# Patient Record
Sex: FEMALE | Race: WHITE | NOT HISPANIC OR LATINO | Employment: PART TIME | URBAN - NONMETROPOLITAN AREA
[De-identification: names, ages, dates, MRNs, and addresses within clinical notes are randomized per-mention and may not be internally consistent; named-entity substitution may affect disease eponyms.]

---

## 2023-12-14 ENCOUNTER — TELEPHONE (OUTPATIENT)
Dept: CARDIOLOGY | Facility: CLINIC | Age: 68
End: 2023-12-14
Payer: MEDICARE

## 2023-12-19 ENCOUNTER — OFFICE VISIT (OUTPATIENT)
Dept: CARDIOLOGY | Facility: CLINIC | Age: 68
End: 2023-12-19
Payer: MEDICARE

## 2023-12-19 VITALS
WEIGHT: 157.2 LBS | BODY MASS INDEX: 25.26 KG/M2 | DIASTOLIC BLOOD PRESSURE: 70 MMHG | HEART RATE: 70 BPM | SYSTOLIC BLOOD PRESSURE: 110 MMHG | HEIGHT: 66 IN

## 2023-12-19 DIAGNOSIS — I34.1 MVP (MITRAL VALVE PROLAPSE): ICD-10-CM

## 2023-12-19 DIAGNOSIS — R00.2 PALPITATIONS: Primary | ICD-10-CM

## 2023-12-19 DIAGNOSIS — R07.89 CHEST TIGHTNESS: ICD-10-CM

## 2023-12-19 DIAGNOSIS — E78.00 HYPERCHOLESTEROLEMIA: ICD-10-CM

## 2023-12-19 DIAGNOSIS — R06.02 SHORTNESS OF BREATH: ICD-10-CM

## 2023-12-19 DIAGNOSIS — R53.82 CHRONIC FATIGUE: ICD-10-CM

## 2023-12-19 PROCEDURE — 1159F MED LIST DOCD IN RCRD: CPT | Performed by: INTERNAL MEDICINE

## 2023-12-19 PROCEDURE — 1160F RVW MEDS BY RX/DR IN RCRD: CPT | Performed by: INTERNAL MEDICINE

## 2023-12-19 PROCEDURE — 93000 ELECTROCARDIOGRAM COMPLETE: CPT | Performed by: INTERNAL MEDICINE

## 2023-12-19 PROCEDURE — 99204 OFFICE O/P NEW MOD 45 MIN: CPT | Performed by: INTERNAL MEDICINE

## 2023-12-19 RX ORDER — DIPHENOXYLATE HYDROCHLORIDE AND ATROPINE SULFATE 2.5; .025 MG/1; MG/1
1 TABLET ORAL WEEKLY
COMMUNITY

## 2023-12-19 RX ORDER — ASPIRIN 81 MG/1
81 TABLET ORAL WEEKLY
COMMUNITY

## 2023-12-20 ENCOUNTER — PATIENT ROUNDING (BHMG ONLY) (OUTPATIENT)
Dept: CARDIOLOGY | Facility: CLINIC | Age: 68
End: 2023-12-20
Payer: MEDICARE

## 2024-01-03 ENCOUNTER — HOSPITAL ENCOUNTER (OUTPATIENT)
Dept: CARDIOLOGY | Facility: HOSPITAL | Age: 69
Discharge: HOME | End: 2024-01-03
Payer: MEDICARE

## 2024-01-03 VITALS — HEIGHT: 66 IN | BODY MASS INDEX: 25.26 KG/M2 | WEIGHT: 157.19 LBS

## 2024-01-03 DIAGNOSIS — R07.89 CHEST TIGHTNESS: ICD-10-CM

## 2024-01-03 DIAGNOSIS — R06.02 SHORTNESS OF BREATH: ICD-10-CM

## 2024-01-03 LAB
AORTIC DIMENSIONLESS INDEX: 0.82 (DI)
BH CV ECHO MEAS - ACS: 1.87 CM
BH CV ECHO MEAS - AO MAX PG: 4.2 MMHG
BH CV ECHO MEAS - AO MEAN PG: 2.5 MMHG
BH CV ECHO MEAS - AO ROOT DIAM: 3 CM
BH CV ECHO MEAS - AO V2 MAX: 102.4 CM/SEC
BH CV ECHO MEAS - AO V2 VTI: 24.9 CM
BH CV ECHO MEAS - EDV(CUBED): 72.3 ML
BH CV ECHO MEAS - EF_3D-VOL: 61 %
BH CV ECHO MEAS - ESV(CUBED): 24.3 ML
BH CV ECHO MEAS - FS: 30.4 %
BH CV ECHO MEAS - IVS/LVPW: 0.99 CM
BH CV ECHO MEAS - IVSD: 0.96 CM
BH CV ECHO MEAS - LA DIMENSION: 2.9 CM
BH CV ECHO MEAS - LAT PEAK E' VEL: 6 CM/SEC
BH CV ECHO MEAS - LV MASS(C)D: 128.5 GRAMS
BH CV ECHO MEAS - LV MAX PG: 3.1 MMHG
BH CV ECHO MEAS - LV MEAN PG: 1.39 MMHG
BH CV ECHO MEAS - LV V1 MAX: 88.4 CM/SEC
BH CV ECHO MEAS - LV V1 VTI: 20.4 CM
BH CV ECHO MEAS - LVIDD: 4.2 CM
BH CV ECHO MEAS - LVIDS: 2.9 CM
BH CV ECHO MEAS - LVPWD: 0.97 CM
BH CV ECHO MEAS - MED PEAK E' VEL: 7.3 CM/SEC
BH CV ECHO MEAS - MV A MAX VEL: 63.1 CM/SEC
BH CV ECHO MEAS - MV DEC SLOPE: 462.2 CM/SEC2
BH CV ECHO MEAS - MV DEC TIME: 0.26 SEC
BH CV ECHO MEAS - MV E MAX VEL: 88.4 CM/SEC
BH CV ECHO MEAS - MV E/A: 1.4
BH CV ECHO MEAS - MV MAX PG: 4.8 MMHG
BH CV ECHO MEAS - MV MEAN PG: 1.77 MMHG
BH CV ECHO MEAS - MV P1/2T: 72.2 MSEC
BH CV ECHO MEAS - MV V2 VTI: 38.3 CM
BH CV ECHO MEAS - MVA(P1/2T): 3 CM2
BH CV ECHO MEAS - PA V2 MAX: 73.4 CM/SEC
BH CV ECHO MEAS - RAP SYSTOLE: 8 MMHG
BH CV ECHO MEAS - RV MAX PG: 1.06 MMHG
BH CV ECHO MEAS - RV V1 MAX: 51.5 CM/SEC
BH CV ECHO MEAS - RV V1 VTI: 11.9 CM
BH CV ECHO MEAS - RVDD: 3 CM
BH CV ECHO MEAS - RVSP: 30.4 MMHG
BH CV ECHO MEAS - TAPSE (>1.6): 2.7 CM
BH CV ECHO MEAS - TR MAX PG: 22.4 MMHG
BH CV ECHO MEAS - TR MAX VEL: 236.7 CM/SEC
BH CV ECHO MEASUREMENTS AVERAGE E/E' RATIO: 13.29
BH CV REST NUCLEAR ISOTOPE DOSE: 10 MCI
BH CV STRESS NUCLEAR ISOTOPE DOSE: 30 MCI
BH CV STRESS RECOVERY BP: NORMAL MMHG
BH CV STRESS RECOVERY HR: 67 BPM
BH CV XLRA - TDI S': 10.9 CM/SEC
LV EF NUC BP: 67 %
MAXIMAL PREDICTED HEART RATE: 153 BPM
PERCENT MAX PREDICTED HR: 92.16 %
SINUS: 2.9 CM
STRESS BASELINE BP: NORMAL MMHG
STRESS BASELINE HR: 62 BPM
STRESS PERCENT HR: 108 %
STRESS POST ESTIMATED WORKLOAD: 10.1 METS
STRESS POST EXERCISE DUR MIN: 7 MIN
STRESS POST EXERCISE DUR SEC: 5 SEC
STRESS POST PEAK BP: NORMAL MMHG
STRESS POST PEAK HR: 141 BPM
STRESS TARGET HR: 130 BPM

## 2024-01-03 PROCEDURE — 93017 CV STRESS TEST TRACING ONLY: CPT

## 2024-01-03 PROCEDURE — A9500 TC99M SESTAMIBI: HCPCS | Performed by: INTERNAL MEDICINE

## 2024-01-03 PROCEDURE — 0 TECHNETIUM SESTAMIBI: Performed by: INTERNAL MEDICINE

## 2024-01-03 PROCEDURE — 93306 TTE W/DOPPLER COMPLETE: CPT

## 2024-01-03 PROCEDURE — 78452 HT MUSCLE IMAGE SPECT MULT: CPT

## 2024-01-03 RX ADMIN — TECHNETIUM TC 99M SESTAMIBI 1 DOSE: 1 INJECTION INTRAVENOUS at 09:50

## 2024-01-03 RX ADMIN — TECHNETIUM TC 99M SESTAMIBI 1 DOSE: 1 INJECTION INTRAVENOUS at 08:32

## 2024-01-05 ENCOUNTER — TELEPHONE (OUTPATIENT)
Dept: CARDIOLOGY | Facility: CLINIC | Age: 69
End: 2024-01-05
Payer: MEDICARE

## 2024-01-05 RX ORDER — METOPROLOL SUCCINATE 25 MG/1
25 TABLET, EXTENDED RELEASE ORAL DAILY
Qty: 90 TABLET | Refills: 3 | Status: SHIPPED | OUTPATIENT
Start: 2024-01-05

## 2024-01-09 RX ORDER — METOPROLOL SUCCINATE 25 MG/1
25 TABLET, EXTENDED RELEASE ORAL DAILY
Qty: 90 TABLET | Refills: 3 | Status: SHIPPED | OUTPATIENT
Start: 2024-01-09

## 2024-06-25 ENCOUNTER — OFFICE VISIT (OUTPATIENT)
Dept: CARDIOLOGY | Facility: CLINIC | Age: 69
End: 2024-06-25
Payer: MEDICARE

## 2024-06-25 VITALS
WEIGHT: 153.8 LBS | DIASTOLIC BLOOD PRESSURE: 64 MMHG | HEART RATE: 68 BPM | SYSTOLIC BLOOD PRESSURE: 108 MMHG | BODY MASS INDEX: 24.72 KG/M2 | HEIGHT: 66 IN

## 2024-06-25 DIAGNOSIS — R00.2 PALPITATIONS: Primary | ICD-10-CM

## 2024-06-25 DIAGNOSIS — R53.82 CHRONIC FATIGUE: ICD-10-CM

## 2024-06-25 DIAGNOSIS — I47.10 PSVT (PAROXYSMAL SUPRAVENTRICULAR TACHYCARDIA): ICD-10-CM

## 2024-06-25 DIAGNOSIS — I34.0 MITRAL VALVE INSUFFICIENCY, UNSPECIFIED ETIOLOGY: ICD-10-CM

## 2024-06-25 DIAGNOSIS — E78.00 HYPERCHOLESTEROLEMIA: ICD-10-CM

## 2024-06-25 PROCEDURE — 93000 ELECTROCARDIOGRAM COMPLETE: CPT | Performed by: INTERNAL MEDICINE

## 2024-06-25 PROCEDURE — 1160F RVW MEDS BY RX/DR IN RCRD: CPT | Performed by: INTERNAL MEDICINE

## 2024-06-25 PROCEDURE — 99214 OFFICE O/P EST MOD 30 MIN: CPT | Performed by: INTERNAL MEDICINE

## 2024-06-25 PROCEDURE — 1159F MED LIST DOCD IN RCRD: CPT | Performed by: INTERNAL MEDICINE

## 2025-06-23 ENCOUNTER — TELEPHONE (OUTPATIENT)
Dept: CARDIOLOGY | Facility: CLINIC | Age: 70
End: 2025-06-23

## 2025-06-23 NOTE — TELEPHONE ENCOUNTER
"Caller: Bigg Devine    Relationship to patient: Self    Best call back number: [phone]     Chief complaint: HAS WORK, PER APPT NOTES \"1 YEAR F/U WITH DR KUNZ\" PT IS OKAY TO SEE ANYONE FOR SOONER APPT BUT DID NOT WANT TO OVERSTEP OFFICE WHEN SCHD     Type of visit: F/U     Requested date: TUESDAY      If rescheduling, when is the original appointment: 6/25/25        "

## 2025-06-24 ENCOUNTER — OFFICE VISIT (OUTPATIENT)
Dept: CARDIOLOGY | Facility: CLINIC | Age: 70
End: 2025-06-24
Payer: MEDICARE

## 2025-06-24 VITALS
BODY MASS INDEX: 22.28 KG/M2 | SYSTOLIC BLOOD PRESSURE: 110 MMHG | DIASTOLIC BLOOD PRESSURE: 60 MMHG | HEIGHT: 66 IN | HEART RATE: 76 BPM | WEIGHT: 138.6 LBS

## 2025-06-24 DIAGNOSIS — I34.0 MITRAL VALVE INSUFFICIENCY, UNSPECIFIED ETIOLOGY: ICD-10-CM

## 2025-06-24 DIAGNOSIS — R00.2 PALPITATIONS: Primary | ICD-10-CM

## 2025-06-24 DIAGNOSIS — E78.00 HYPERCHOLESTEROLEMIA: ICD-10-CM

## 2025-06-24 DIAGNOSIS — I47.10 PSVT (PAROXYSMAL SUPRAVENTRICULAR TACHYCARDIA): ICD-10-CM

## 2025-06-24 PROCEDURE — 99213 OFFICE O/P EST LOW 20 MIN: CPT | Performed by: INTERNAL MEDICINE

## 2025-06-24 PROCEDURE — 1159F MED LIST DOCD IN RCRD: CPT | Performed by: INTERNAL MEDICINE

## 2025-06-24 PROCEDURE — 1160F RVW MEDS BY RX/DR IN RCRD: CPT | Performed by: INTERNAL MEDICINE

## 2025-06-24 NOTE — PROGRESS NOTES
Chief Complaint   Patient presents with   • Follow-up     Cardiac management . Patient is here today for yearly follow up   • LABS     Had labs from Dr Maria . May 7,2025   CHO- 212  TRI- 115  HDL- 84  LDL-  105   • Med Refill     No refills needed       CARDIAC COMPLAINTS  Cardiac management        Subjective   Bigg Devine is a 70 y.o. female came in today for her regular follow-up visit.  She has history of chest pain, palpitation and shortness of breath in the past.  She has undergone workup in the form of stress test which showed no definite ischemia but did have mild increase in chronotropic and blood pressure response and low-dose of beta-blockers were started.  However echocardiogram was essentially unremarkable and the pulmonary showed few short runs of SVT.  She was not able to tolerate the beta-blockers.  Tensive so she continued on but still having problem.  She completely stopped taking it now.  She is now feeling much better without any medication.  She is more active.  She has been to [place] recently she was able to do the hiking without any problem.  She did bring a copy of her labs and found the cholesterol is still slightly elevated at 212 with the LDL of 105 but the HDL was 84.              Cardiac History  Past Surgical History:   Procedure Laterality Date   • CARDIOVASCULAR STRESS TEST  01/02/2008    10 Min. 13.5 METS. 90% THR. 126/70. EF 65%. Negative   • CARDIOVASCULAR STRESS TEST  03/21/2013    9 Min. 10.1 METS. 93% THR. 158/82. EF > 65%R/O Septal Ischemia   • CARDIOVASCULAR STRESS TEST  06/22/2010    9 Min. 11 METS. 92% THR. 158/76. EF 64%. Negative   • CARDIOVASCULAR STRESS TEST  01/03/2024    7.05 Min. 10.1 METS. 92% THR. 188/69. EF 67%. Breast attenuation   • CONVERTED (HISTORICAL) HOLTER  01/27/2024    11 Days. Avg-72. . 6 SVT   • ECHO - CONVERTED  01/02/2008    EF > 60%. MVP, Mild MR. RVSP- 33 mmHg   • ECHO - CONVERTED  01/03/2024    EF 65%. Mild MR. RVSP- 31 mmHg        Current Outpatient Medications   Medication Sig Dispense Refill   • aspirin 81 MG EC tablet Take 1 tablet by mouth 1 (One) Time Per Week.     • CORAL CALCIUM PO Take 1 tablet by mouth 1 (One) Time Per Week.     • MAGNESIUM PO Take  by mouth 1 (One) Time Per Week.     • multivitamin tablet tablet Take 1 tablet by mouth 1 (One) Time Per Week.     • VITAMIN D PO Take  by mouth 1 (One) Time Per Week.     • ZINC OXIDE PO Take  by mouth 1 (One) Time Per Week.       No current facility-administered medications for this visit.       Allergies  :  Patient has no known allergies.       Past Medical History:   Diagnosis Date   • Arrhythmia    • Hx of bilateral cataract extraction    • Hyperlipidemia    • Hypertension 1/3/2024   • Mitral valve prolapse        Social History     Socioeconomic History   • Marital status:    Tobacco Use   • Smoking status: Never     Passive exposure: Past   • Smokeless tobacco: Never   Vaping Use   • Vaping status: Never Used   Substance and Sexual Activity   • Alcohol use: Not Currently     Alcohol/week: 1.0 standard drink of alcohol     Types: 1 Glasses of wine per week     Comment: Maybe once a month   • Drug use: Never   • Sexual activity: Not Currently     Partners: Male     Birth control/protection: None       Family History   Problem Relation Age of Onset   • Hypertension Mother    • Heart disease Mother    • Arrhythmia Mother    • Stroke Mother    • Stroke Father    • Hyperlipidemia Brother    • Hypertension Brother    • Heart disease Brother    • No Known Problems Brother    • Heart attack Maternal Grandmother    • Cancer Maternal Grandfather    • Stroke Paternal Grandmother    • Stroke Paternal Grandfather    • No Known Problems Son        Review of Systems   Constitutional: Negative for decreased appetite and malaise/fatigue.   HENT:  Negative for congestion and sore throat.    Eyes:  Negative for blurred vision, double vision and visual disturbance.   Cardiovascular:   "Negative for chest pain.   Respiratory:  Negative for shortness of breath and snoring.    Endocrine: Negative for cold intolerance and heat intolerance.   Hematologic/Lymphatic: Negative for adenopathy. Does not bruise/bleed easily.   Skin:  Negative for itching, nail changes and skin cancer.   Musculoskeletal:  Negative for arthritis and myalgias.   Gastrointestinal:  Negative for abdominal pain, dysphagia and heartburn.   Genitourinary:  Negative for bladder incontinence and frequency.   Neurological:  Negative for dizziness, seizures and vertigo.   Psychiatric/Behavioral:  Negative for altered mental status.    Allergic/Immunologic: Negative for environmental allergies and hives.     Diabetes- No  Thyroid- normal    Objective     /60 (BP Location: Left arm, Patient Position: Sitting, Cuff Size: Adult)   Pulse 76   Ht 167.6 cm (65.98\")   Wt 62.9 kg (138 lb 9.6 oz)   BMI 22.38 kg/m²     Vitals and nursing note reviewed.   Constitutional:       Appearance: Healthy appearance. Not in distress.   Eyes:      Conjunctiva/sclera: Conjunctivae normal.      Pupils: Pupils are equal, round, and reactive to light.   HENT:      Head: Normocephalic.   Pulmonary:      Effort: Pulmonary effort is normal.      Breath sounds: Normal breath sounds.   Cardiovascular:      PMI at left midclavicular line. Normal rate. Regular rhythm.      Murmurs: There is a grade 3/6 high frequency blowing holosystolic murmur at the apex.   Abdominal:      General: Bowel sounds are normal.      Palpations: Abdomen is soft.   Musculoskeletal: Normal range of motion.      Cervical back: Normal range of motion and neck supple. Skin:     General: Skin is warm and dry.   Neurological:      Mental Status: Alert, oriented to person, place, and time and oriented to person, place and time.   Procedures            @ASSESSMENT/PLAN@  BMI is within normal parameters. No other follow-up for BMI required.     Diagnoses and all orders for this " visit:    1. Palpitations (Primary)    2. PSVT (paroxysmal supraventricular tachycardia)    3. Hypercholesterolemia    4. Mitral valve insufficiency, unspecified etiology       At baseline her heart rate and blood pressure appear stable.  Her BMI is normal.  Her cardiovascular examination is unremarkable other than short systolic murmur at the mitral area.    Her palpitation in the past had shown some PSVT but now she is not having any symptoms.  She is not on any beta-blockers.  Will continue to monitor.  She is advised to call our office if the symptoms of palpitation reoccurs    Cholesterol is mildly elevated but she also has a chronic HDL.  Talked to her about the diet especially cutting down on the fat intake.  At this time since she is not having any vascular disease no evidence of any statin    Regarding the mitral regurgitation, it appears to be mild.  At this time I do not see we need to repeat echocardiogram.    Overall cardiac status appears stable.  I will see her back in a year or sooner if needed                  Electronically signed by Dionna Martinez MD June 24, 2025 14:06 EDT

## 2025-06-24 NOTE — LETTER
June 24, 2025     Epi Maria MD  [address] [place] [id number]    Patient: Bigg Devine   YOB: 1955   Date of Visit: 6/24/2025     Dear Epi Maria MD:       Thank you for referring Bigg Devine to me for evaluation. Below are the relevant portions of my assessment and plan of care.    If you have questions, please do not hesitate to call me. I look forward to following Bigg along with you.         Sincerely,        Dionna Martinez MD        CC: No Recipients    Dionna Martinez MD  06/24/25 1411  Sign when Signing Visit  Chief Complaint   Patient presents with   • Follow-up     Cardiac management . Patient is here today for yearly follow up   • LABS     Had labs from Dr Maria . May 7,2025   CHO- 212  TRI- 115  HDL- 84  LDL-  105   • Med Refill     No refills needed       CARDIAC COMPLAINTS  Cardiac management        Subjective  Bigg Devine is a 70 y.o. female came in today for her regular follow-up visit.  She has history of chest pain, palpitation and shortness of breath in the past.  She has undergone workup in the form of stress test which showed no definite ischemia but did have mild increase in chronotropic and blood pressure response and low-dose of beta-blockers were started.  However echocardiogram was essentially unremarkable and the pulmonary showed few short runs of SVT.  She was not able to tolerate the beta-blockers.  Tensive so she continued on but still having problem.  She completely stopped taking it now.  She is now feeling much better without any medication.  She is more active.  She has been to [place] recently she was able to do the hiking without any problem.  She did bring a copy of her labs and found the cholesterol is still slightly elevated at 212 with the LDL of 105 but the HDL was 84.              Cardiac History  Past Surgical History:   Procedure Laterality Date   • CARDIOVASCULAR STRESS TEST  01/02/2008    10 Min. 13.5 METS. 90%  THR. 126/70. EF 65%. Negative   • CARDIOVASCULAR STRESS TEST  03/21/2013    9 Min. 10.1 METS. 93% THR. 158/82. EF > 65%R/O Septal Ischemia   • CARDIOVASCULAR STRESS TEST  06/22/2010    9 Min. 11 METS. 92% THR. 158/76. EF 64%. Negative   • CARDIOVASCULAR STRESS TEST  01/03/2024    7.05 Min. 10.1 METS. 92% THR. 188/69. EF 67%. Breast attenuation   • CONVERTED (HISTORICAL) HOLTER  01/27/2024    11 Days. Avg-72. . 6 SVT   • ECHO - CONVERTED  01/02/2008    EF > 60%. MVP, Mild MR. RVSP- 33 mmHg   • ECHO - CONVERTED  01/03/2024    EF 65%. Mild MR. RVSP- 31 mmHg       Current Outpatient Medications   Medication Sig Dispense Refill   • aspirin 81 MG EC tablet Take 1 tablet by mouth 1 (One) Time Per Week.     • CORAL CALCIUM PO Take 1 tablet by mouth 1 (One) Time Per Week.     • MAGNESIUM PO Take  by mouth 1 (One) Time Per Week.     • multivitamin tablet tablet Take 1 tablet by mouth 1 (One) Time Per Week.     • VITAMIN D PO Take  by mouth 1 (One) Time Per Week.     • ZINC OXIDE PO Take  by mouth 1 (One) Time Per Week.       No current facility-administered medications for this visit.       Allergies  :  Patient has no known allergies.       Past Medical History:   Diagnosis Date   • Arrhythmia    • Hx of bilateral cataract extraction    • Hyperlipidemia    • Hypertension 1/3/2024   • Mitral valve prolapse        Social History     Socioeconomic History   • Marital status:    Tobacco Use   • Smoking status: Never     Passive exposure: Past   • Smokeless tobacco: Never   Vaping Use   • Vaping status: Never Used   Substance and Sexual Activity   • Alcohol use: Not Currently     Alcohol/week: 1.0 standard drink of alcohol     Types: 1 Glasses of wine per week     Comment: Maybe once a month   • Drug use: Never   • Sexual activity: Not Currently     Partners: Male     Birth control/protection: None       Family History   Problem Relation Age of Onset   • Hypertension Mother    • Heart disease Mother    • Arrhythmia  "Mother    • Stroke Mother    • Stroke Father    • Hyperlipidemia Brother    • Hypertension Brother    • Heart disease Brother    • No Known Problems Brother    • Heart attack Maternal Grandmother    • Cancer Maternal Grandfather    • Stroke Paternal Grandmother    • Stroke Paternal Grandfather    • No Known Problems Son        Review of Systems   Constitutional: Negative for decreased appetite and malaise/fatigue.   HENT:  Negative for congestion and sore throat.    Eyes:  Negative for blurred vision, double vision and visual disturbance.   Cardiovascular:  Negative for chest pain.   Respiratory:  Negative for shortness of breath and snoring.    Endocrine: Negative for cold intolerance and heat intolerance.   Hematologic/Lymphatic: Negative for adenopathy. Does not bruise/bleed easily.   Skin:  Negative for itching, nail changes and skin cancer.   Musculoskeletal:  Negative for arthritis and myalgias.   Gastrointestinal:  Negative for abdominal pain, dysphagia and heartburn.   Genitourinary:  Negative for bladder incontinence and frequency.   Neurological:  Negative for dizziness, seizures and vertigo.   Psychiatric/Behavioral:  Negative for altered mental status.    Allergic/Immunologic: Negative for environmental allergies and hives.     Diabetes- No  Thyroid- normal    Objective    /60 (BP Location: Left arm, Patient Position: Sitting, Cuff Size: Adult)   Pulse 76   Ht 167.6 cm (65.98\")   Wt 62.9 kg (138 lb 9.6 oz)   BMI 22.38 kg/m²     Vitals and nursing note reviewed.   Constitutional:       Appearance: Healthy appearance. Not in distress.   Eyes:      Conjunctiva/sclera: Conjunctivae normal.      Pupils: Pupils are equal, round, and reactive to light.   HENT:      Head: Normocephalic.   Pulmonary:      Effort: Pulmonary effort is normal.      Breath sounds: Normal breath sounds.   Cardiovascular:      PMI at left midclavicular line. Normal rate. Regular rhythm.      Murmurs: There is a grade 3/6 high " frequency blowing holosystolic murmur at the apex.   Abdominal:      General: Bowel sounds are normal.      Palpations: Abdomen is soft.   Musculoskeletal: Normal range of motion.      Cervical back: Normal range of motion and neck supple. Skin:     General: Skin is warm and dry.   Neurological:      Mental Status: Alert, oriented to person, place, and time and oriented to person, place and time.   Procedures            @ASSESSMENT/PLAN@  BMI is within normal parameters. No other follow-up for BMI required.     Diagnoses and all orders for this visit:    1. Palpitations (Primary)    2. PSVT (paroxysmal supraventricular tachycardia)    3. Hypercholesterolemia    4. Mitral valve insufficiency, unspecified etiology       At baseline her heart rate and blood pressure appear stable.  Her BMI is normal.  Her cardiovascular examination is unremarkable other than short systolic murmur at the mitral area.    Her palpitation in the past had shown some PSVT but now she is not having any symptoms.  She is not on any beta-blockers.  Will continue to monitor.  She is advised to call our office if the symptoms of palpitation reoccurs    Cholesterol is mildly elevated but she also has a chronic HDL.  Talked to her about the diet especially cutting down on the fat intake.  At this time since she is not having any vascular disease no evidence of any statin    Regarding the mitral regurgitation, it appears to be mild.  At this time I do not see we need to repeat echocardiogram.    Overall cardiac status appears stable.  I will see her back in a year or sooner if needed                  Electronically signed by Dionna Martinez MD June 24, 2025 14:06 EDT